# Patient Record
(demographics unavailable — no encounter records)

---

## 2025-04-28 NOTE — HEALTH RISK ASSESSMENT
[Yes] : Yes [2 - 4 times a month (2 pts)] : 2-4 times a month (2 points) [3 or 4 (1 pt)] : 3 or 4  (1 point) [Never (0 pts)] : Never (0 points) [2] : 1) Little interest or pleasure doing things for more than half of the days (2) [1] : 2) Feeling down, depressed, or hopeless for several days (1) [PHQ-2 Positive] : PHQ-2 Positive [Nearly Every Day (3)] : 4.) Feeling tired or having little energy? Nearly every day [1/2 of Days or More (2)] : 5.) Poor appetite or overeating? Half the days or more [Several Days (1)] : 7.) Trouble concentrating on things, such as reading a newspaper or watching television? Several days [Not at All (0)] : 9.) Thoughts that you would be off dead or of hurting yourself in some way? Not at all [Moderate] : Severity of Depression is Moderate [Somewhat Difficult] : How difficult have these problems made it for you to do your work, take care of things at home, or get along with people? Somewhat difficult [Patient reported PAP Smear was normal] : Patient reported PAP Smear was normal [HIV test declined] : HIV test declined [Hepatitis C test offered] : Hepatitis C test offered [Never] : Never [NO] : No [[id number]] : 3 [[id number]] : 11 [PapSmearComments] : due, will schedule

## 2025-04-28 NOTE — PLAN
[FreeTextEntry1] : #ADHD #Depression #Anxiety - Behavioral health referral - Check TFTs, CBC, vitamin D - Close follow-up to discuss medication treatment  #HM - Bloodwork today - Vaccine appt for tdap and/or Gardasil if due - Get records - F/u to discuss alternative options to OCPs (nexplanon, vaginal ring)

## 2025-04-28 NOTE — ASSESSMENT
[Vaccines Reviewed] : Immunizations reviewed today. Please see immunization details in the vaccine log within the immunization flowsheet.  [FreeTextEntry1] : #ADHD #Depression #Anxiety Pt has a h/o taking adderall for ADHD, happy to not currently be taking medication. She endorses symptoms of anxiety and depression. We completed the PHQ9 and the GAD7, she scored 11 on PHQ9 (moderate depression) and 9 on GAD7 (mild anxiety), no SI or HI. We discussed starting with therapy and continued conversation surrounding possible medication to help manage her symptoms after ruling out other causes.  #HM Unsure of last tdap, will check Due for pap, no h/o abn Thinks completed gardasil Does not need STI screening today Will do bloodwork today OCPs for contraception but would like to explore other options (we briefly discussed nexplanon vs vaginal ring as pt does not want IUD and has not had a good experience with various OCPs)

## 2025-04-28 NOTE — HISTORY OF PRESENT ILLNESS
[de-identified] : PRITESH AMIN is a 27 year y/o F with PMH of ADHD who presents as a new patient today to establish care. CC annual physical, weight gain of 6 lbs wants to explore options  #ADHD #Depresion #Anxiety PHQ9 score 11 HARSHAL 7 score of 9 - mild anxiety ADHD Would like behavioral health referral   PMH: ADHD diagnosed as a child, on adderall since age 8, not currently on it. Anxiety undiagnosed, feels manageable unless work is more stressful. 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

## 2025-05-05 NOTE — DATA REVIEWED
[FreeTextEntry1] : We reviewed all blood tests from previous appt, including - Borderline low vitamin D level, supplement sent - Borderline low vitamin B12 level, supplement discussed - Elevated bilirubin of 1.8 - Lipids all wnl - CBC wnl - TSH normal - A1C normal - Hepatitis serology reviewed, including immunity to HAV but not HBV. 
113

## 2025-05-05 NOTE — PHYSICAL EXAM
[No Acute Distress] : no acute distress [Well-Appearing] : well-appearing [Normal Sclera/Conjunctiva] : normal sclera/conjunctiva [EOMI] : extraocular movements intact [Coordination Grossly Intact] : coordination grossly intact [Normal Gait] : normal gait [Normal] : affect was normal and insight and judgment were intact

## 2025-05-05 NOTE — PLAN
[FreeTextEntry1] : #Sore Throat - Flu/CovidRSV swab - Strep PCR - Flonase and OTC allergy medication (claritin, allegra, zyrtec during the day, or benadryl at night) - Supportive care with fluids, humidifier, tea and honey, soups  #Hyperbilirubinemia - Check LFTs at next visit  #Anixxety & Depression - Pt to connect with behavioral health/therapy - Ongoing discussion about treatment  #HM - Hep B vaccine at next appt - Start Vitamin D supplement 1000 IU daily - OTC B12 supplement

## 2025-05-05 NOTE — ASSESSMENT
[FreeTextEntry1] : #Sore Throat Pt has 1 day of sore throat. No cough, no fevers, chills, body aches, congestion, or decreased appetite. We discussed supportive care with fluids, soup, tea and honey, humidifier, and treatment for allergies which are likely contributing to symptoms. Offered testing for flu, covid, RSV, and strep.  #Hyperbilirubinemia Discussed rechecking LFTs which pt would like to do at next appt  #Anxiety & Depression Was contacted by behavioral health referral, plans to call them back. Notes that mood has been stable over the last week.  #HM Rechecked pt's weight which is consistent this week with her usual weight. Reviewed recent blood tests and results, discussed supplementation of vitamin D and vitamin B12. Offered immunization against Hep B which she would like to do at follow-up appt